# Patient Record
Sex: MALE | Race: WHITE
[De-identification: names, ages, dates, MRNs, and addresses within clinical notes are randomized per-mention and may not be internally consistent; named-entity substitution may affect disease eponyms.]

---

## 2019-02-25 ENCOUNTER — HOSPITAL ENCOUNTER (EMERGENCY)
Dept: HOSPITAL 61 - ER | Age: 39
Discharge: LEFT BEFORE DISCHARGE | End: 2019-02-25
Payer: COMMERCIAL

## 2019-02-25 VITALS — WEIGHT: 170 LBS | HEIGHT: 70 IN | BODY MASS INDEX: 24.34 KG/M2

## 2019-02-25 VITALS — DIASTOLIC BLOOD PRESSURE: 83 MMHG | SYSTOLIC BLOOD PRESSURE: 118 MMHG
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
## 2019-02-25 DIAGNOSIS — G89.11: ICD-10-CM
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
## 2019-02-25 DIAGNOSIS — Z53.21: ICD-10-CM
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
## 2019-02-25 DIAGNOSIS — M79.641: Primary | ICD-10-CM
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
## 2019-08-24 ENCOUNTER — HOSPITAL ENCOUNTER (EMERGENCY)
Dept: HOSPITAL 61 - ER | Age: 39
Discharge: HOME | End: 2019-08-24
Payer: COMMERCIAL

## 2019-08-24 VITALS — HEIGHT: 68 IN | WEIGHT: 180 LBS | BODY MASS INDEX: 27.28 KG/M2

## 2019-08-24 VITALS — SYSTOLIC BLOOD PRESSURE: 138 MMHG | DIASTOLIC BLOOD PRESSURE: 83 MMHG
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
## 2019-08-24 DIAGNOSIS — K21.9: ICD-10-CM
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
## 2019-08-24 DIAGNOSIS — F31.9: ICD-10-CM
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
## 2019-08-24 DIAGNOSIS — S62.399A: Primary | ICD-10-CM

## 2019-08-24 PROCEDURE — 99284 EMERGENCY DEPT VISIT MOD MDM: CPT

## 2019-08-24 PROCEDURE — 73110 X-RAY EXAM OF WRIST: CPT

## 2019-08-24 PROCEDURE — 29125 APPL SHORT ARM SPLINT STATIC: CPT

## 2019-08-24 PROCEDURE — 73130 X-RAY EXAM OF HAND: CPT

## 2019-08-24 NOTE — RAD
EXAM: 

1. RIGHT HAND 3 VIEWS.

2. RIGHT WRIST 3 VIEWS.

 

HISTORY: Trauma, pain.

 

COMPARISON: None.

 

FINDINGS: No fractures are identified in the wrist. Alignment is 

maintained. Joint spaces are maintained.

 

There are transverse fractures of the fourth metacarpal diaphysis and 

fifth metacarpal proximal metaphysis. There is mild volar angulation of 

both distal fracture fragments. The fifth metacarpal fracture is 

superimposed on a subacute to chronic fracture deformity. Soft tissue 

swelling is noted. Joint spaces are maintained.

 

IMPRESSION: 

1. Mildly volarly angulated fractures of the fourth and fifth metacarpals.

 

Electronically signed by: GABRIELLE Spivey MD (8/24/2019 3:08 AM) 

[facility]

## 2019-08-24 NOTE — PHYS DOC
Past Medical History


Past Medical History:  Bipolar, GERD


Past Surgical History:  Other


Additional Past Surgical Histo:  Left wrist ORIF


Alcohol Use:  Heavy


Drug Use:  None





Adult General


Chief Complaint


Chief Complaint:  UPPER EXTREMITY INJURY





HPI


HPI





Patient is a 38  year old m drank alcohol


tonight


states fell from standing onto the affected hand. 


no head injury


no loc 


no chest pain no abdo pain no other injury at all


pain moderate to severe


mid hand area.





Review of Systems


Review of Systems





Constitutional: Denies fever or chills []


Eyes: Denies change in visual acuity, redness, or eye pain []





Cardiovascular: No additional information not addressed in HPI []


GI: Denies abdominal pain, nausea, vomiting, bloody stools or diarrhea []





All other systems were reviewed and found to be within normal limits, except as 

documented in this note.





Current Medications


Current Medications





Current Medications








 Medications


  (Trade)  Dose


 Ordered  Sig/Naty  Start Time


 Stop Time Status Last Admin


Dose Admin


 


 Acetaminophen/


 Hydrocodone Bitart


  (Lortab 5/325)  1 tab  1X  ONCE  8/24/19 02:30


 8/24/19 02:57 DC 8/24/19 02:41


1 TAB











Allergies


Allergies





Allergies








Coded Allergies Type Severity Reaction Last Updated Verified


 


  No Known Drug Allergies    8/24/19 No











Physical Exam


Physical Exam





Constitutional: Well developed, mild anxiety slurred speech c/w known etoh 

intake. 


HENT: Normocephalic, atraumatic, bilateral external ears normal, oropharynx 

moist, no oral exudates, nose normal. []


Eyes: PERRLA, EOMI, conjunctiva normal, no discharge. [] 


Neck: Normal range of motion, no tenderness, supple, no stridor. [] 


Pulmonary: Normal respiratory effort no increased work of breathing no obvious 

chest wall trauma


Abdomen: Bowel sounds normal, soft, no tenderness, no masses, no pulsatile 

masses. [] 


Skin: Warm, dry, no erythema, no rash. [] 


Back: No tenderness, no CVA tenderness. [] 


Extremities: ttp swelling noted to the right dorsum of hand abrasion noted. no 

snuffbox ttp noted.


Neurologic: Alert and oriented X 3, normal motor function, normal sensory 

function, no focal deficits noted. []


Psychologic: anxious but overall redirectable.





Current Patient Data


Vital Signs





                                   Vital Signs








  Date Time  Temp Pulse Resp B/P (MAP) Pulse Ox O2 Delivery O2 Flow Rate FiO2


 


8/24/19 02:41   16  99 Room Air  


 


8/24/19 02:15 98.2 129  138/83 (101)    





 98.2       











EKG


EKG


[]





Radiology/Procedures


Radiology/Procedures


[]


Impressions:


 


COMPARISON: None.


 


FINDINGS: No fractures are identified in the wrist. Alignment is 


maintained. Joint spaces are maintained.


 


There are transverse fractures of the fourth metacarpal diaphysis and 


fifth metacarpal proximal metaphysis. There is mild volar angulation of 


both distal fracture fragments. The fifth metacarpal fracture is 


superimposed on a subacute to chronic fracture deformity. Soft tissue 


swelling is noted. Joint spaces are maintained.


 


IMPRESSION: 


1. Mildly volarly angulated fractures of the fourth and fifth metacarpals.


 


Electronically signed by: GABRIELLE Spivey MD (8/24/2019 3:08 AM) 


[facility]














DICTATED and SIGNED BY:     CHRISTIANO SPIVEY MD


DATE:     08/24/19 0308








Course & Med Decision Making


Course & Med Decision Making


Pertinent Labs and Imaging studies reviewed. (See chart for details)





[]splint applied ulnar gutter i checked the location and pt neurovascular intact

 following





recommended f/u with orthopedics





pt voiced understanding.





noted the tachycardia, likely related to etoh pain and pt appears mildly 

agitated, i offered iv fluids period of observation etc. he declined he just 

wants to go home. return prec discussed.





Dragon Disclaimer


Dragon Disclaimer


This electronic medical record was generated, in whole or in part, using a voice

 recognition dictation system.





Departure


Departure


Impression:  


   Primary Impression:  


   Fx metacarpal


Disposition:  01 HOME, SELF-CARE


Condition:  STABLE


Referrals:  


LIBBY FREGOSO II, MD


Patient Instructions:  Metacarpal Fracture-SportsMed


Scripts


Hydrocodone/Apap 5-325 (NORCO 5-325 TABLET) 1 Each Tablet


1-2 EACH PO PRN Q6HRS PRN for PAIN, #15


   as needed for pain


   Prov: ERIC PATEL MD         8/24/19











ERIC PATEL MD            Aug 24, 2019 04:22

## 2019-08-24 NOTE — RAD
EXAM: 

1. RIGHT HAND 3 VIEWS.

2. RIGHT WRIST 3 VIEWS.

 

HISTORY: Trauma, pain.

 

COMPARISON: None.

 

FINDINGS: No fractures are identified in the wrist. Alignment is 

maintained. Joint spaces are maintained.

 

There are transverse fractures of the fourth metacarpal diaphysis and 

fifth metacarpal proximal metaphysis. There is mild volar angulation of 

both distal fracture fragments. The fifth metacarpal fracture is 

superimposed on a subacute to chronic fracture deformity. Soft tissue 

swelling is noted. Joint spaces are maintained.

 

IMPRESSION: 

1. Mildly volarly angulated fractures of the fourth and fifth metacarpals.

 

Electronically signed by: GABRIELLE Spivey MD (8/24/2019 3:08 AM) 

[facility]